# Patient Record
Sex: MALE | Race: WHITE | NOT HISPANIC OR LATINO
[De-identification: names, ages, dates, MRNs, and addresses within clinical notes are randomized per-mention and may not be internally consistent; named-entity substitution may affect disease eponyms.]

---

## 2018-02-06 ENCOUNTER — APPOINTMENT (OUTPATIENT)
Dept: DERMATOLOGY | Facility: CLINIC | Age: 58
End: 2018-02-06
Payer: COMMERCIAL

## 2018-02-06 VITALS — HEIGHT: 73 IN | WEIGHT: 208 LBS | BODY MASS INDEX: 27.57 KG/M2

## 2018-02-06 DIAGNOSIS — D48.5 NEOPLASM OF UNCERTAIN BEHAVIOR OF SKIN: ICD-10-CM

## 2018-02-06 DIAGNOSIS — Z00.00 ENCOUNTER FOR GENERAL ADULT MEDICAL EXAMINATION W/OUT ABNORMAL FINDINGS: ICD-10-CM

## 2018-02-06 DIAGNOSIS — Z80.9 FAMILY HISTORY OF MALIGNANT NEOPLASM, UNSPECIFIED: ICD-10-CM

## 2018-02-06 PROCEDURE — 11100 BX SKIN SUBCUTANEOUS&/MUCOUS MEMBRANE 1 LESION: CPT

## 2018-02-06 PROCEDURE — 99213 OFFICE O/P EST LOW 20 MIN: CPT | Mod: 25

## 2018-02-13 LAB — CORE LAB BIOPSY: NORMAL

## 2019-08-27 ENCOUNTER — APPOINTMENT (OUTPATIENT)
Dept: DERMATOLOGY | Facility: CLINIC | Age: 59
End: 2019-08-27
Payer: COMMERCIAL

## 2019-08-27 VITALS — WEIGHT: 204 LBS | BODY MASS INDEX: 27.04 KG/M2 | HEIGHT: 73 IN

## 2019-08-27 DIAGNOSIS — L25.5 UNSPECIFIED CONTACT DERMATITIS DUE TO PLANTS, EXCEPT FOOD: ICD-10-CM

## 2019-08-27 DIAGNOSIS — D22.9 MELANOCYTIC NEVI, UNSPECIFIED: ICD-10-CM

## 2019-08-27 DIAGNOSIS — D18.01 HEMANGIOMA OF SKIN AND SUBCUTANEOUS TISSUE: ICD-10-CM

## 2019-08-27 DIAGNOSIS — L82.1 OTHER SEBORRHEIC KERATOSIS: ICD-10-CM

## 2019-08-27 PROCEDURE — 99213 OFFICE O/P EST LOW 20 MIN: CPT

## 2019-08-27 NOTE — HISTORY OF PRESENT ILLNESS
[FreeTextEntry1] : Patient presents for skin examination. [de-identified] : Notes facial rash over the past week.  Was pruritic, currently asymptomatic.  No bleeding or discharge.

## 2019-08-27 NOTE — ASSESSMENT
[FreeTextEntry1] : A complete skin examination was performed.  There is no evidence of skin cancer.  We discussed the importance of photoprotection, including the use of hats, protective clothing and sunscreens with an SPF of at least 30.  Sun avoidance was also discussed.\par \par Rhus derm\par education.\par Declines tx.\par follow.

## 2020-02-10 ENCOUNTER — APPOINTMENT (OUTPATIENT)
Dept: ORTHOPEDIC SURGERY | Facility: CLINIC | Age: 60
End: 2020-02-10
Payer: COMMERCIAL

## 2020-02-10 VITALS
HEIGHT: 73 IN | BODY MASS INDEX: 27.04 KG/M2 | SYSTOLIC BLOOD PRESSURE: 137 MMHG | HEART RATE: 76 BPM | TEMPERATURE: 98 F | DIASTOLIC BLOOD PRESSURE: 87 MMHG | WEIGHT: 204 LBS

## 2020-02-10 PROCEDURE — 99213 OFFICE O/P EST LOW 20 MIN: CPT

## 2020-02-10 PROCEDURE — 73560 X-RAY EXAM OF KNEE 1 OR 2: CPT | Mod: RT

## 2020-02-10 RX ORDER — SODIUM HYALURONATE INTRA-ARTICULAR SOLN PREF SYR 25 MG/2.5ML 25/2.5 MG/ML
25 SOLUTION PREFILLED SYRINGE INTRAARTICULAR
Qty: 5 | Refills: 0 | Status: ACTIVE | OUTPATIENT
Start: 2020-02-10

## 2020-02-13 NOTE — PHYSICAL EXAM
[de-identified] : Left Ankle: \par Range of Motion in Degrees:\par 	                                Claimant:	                Normal:	\par Dorsiflexion (Active)	40-degrees	40-degrees	\par Dorsiflexion (Passive)	40-degrees	40-degrees	\par Plantar (Active)	                40-degrees	40-degrees	\par Plantar (Passive)	                40-degrees	40-degrees	\par Inversion (Active)	                30-degrees	30-degrees	\par Inversion (Passive)	                30-degrees	30-degrees	\par Eversion  (Active)	                20-degrees	20-degrees	\par Eversion (Passive) 	                20-degrees	20-degrees	\par \par No weakness in dorsiflexion, plantar flexion, inversion or eversion.  Normal sensation.  No tenderness over the medial or lateral ligaments.  No tenderness over the DLES.  No evidence of instability.  Negative anterior drawer sign.  No medial or lateral bony tenderness.  No proximal fibular tenderness.  No anterior, posterior, medial or lateral tendon tenderness.  No intra-articular swelling.  No extra-articular swelling, edema or tenderness.  No tenderness over the plantar aspect of the os calcis.  2+ DP and PT pulses. Skin is intact.  No rashes, scars or lesions.  \par  \par Right Ankle: \par Range of Motion in Degrees:\par 	                                Claimant:	Normal:	\par Dorsiflexion (Active)	40-degrees	40-degrees	\par Dorsiflexion (Passive)	40-degrees	40-degrees	\par Plantar (Active)       	40-degrees	40-degrees	\par Plantar (Passive)     	40-degrees	40-degrees	\par Inversion (Active)    	30-degrees	30-degrees	\par Inversion (Passive)  	30-degrees	30-degrees	\par Eversion  (Active)   	20-degrees	20-degrees	\par Eversion (Passive)   	20-degrees	20-degrees	\par \par No weakness in dorsiflexion, plantar flexion, inversion or eversion.  Normal sensation.  Positive diffuse swelling laterally.  Tender over the lateral ligaments.  No tenderness over the medial  ligaments.  No tenderness over the DLES.  No evidence of instability.  Negative anterior drawer sign.  No medial bony tenderness.  No proximal fibular tenderness.  No anterior, posterior, medial or lateral tendon tenderness.  No intra-articular swelling.  No extra-articular swelling, edema or tenderness.  No tenderness over the plantar aspect of the os calcis.  2+ DP and PT pulses. Skin is intact.  No rashes, scars or lesions.  \par  \par Left Knee: \par Range of Motion in Degrees	\par 	                  Claimant/Normal:\par 		\par Flexion Active            135                        135-degrees\par Flexion Passive	  135	                135-degrees	\par Extension Active	  0-5	                0-5-degrees	\par Extension Passive	  0-5	                0-5-degrees	\par \par No weakness to flexion/extension.  No evidence of instability in the AP plane or varus or valgus stress.  Negative  Lachman.  Negative pivot shift.  Negative anterior drawer test.  Negative posterior drawer test.  Negative Renata.  Negative Apley grind.  No medial or lateral joint line tenderness.  No tenderness over the medial and lateral facet of the patella.  No patellofemoral crepitations.  No lateral tilting patella.  No patellar apprehension.  No crepitation in the medial and lateral femoral condyle.  No proximal or distal swelling, edema or tenderness.  No gross motor or sensory deficits.  No intra-articular swelling.  2+ DP and PT pulses. No varus or valgus malalignment.  Skin is intact.  No rashes, scars or lesions.\par \par Right Knee: \par Range of Motion in Degrees	\par 	                  Claimant:	Normal:	\par Flexion Active	  120 	                135-degrees	\par Flexion Passive	  120	                135-degrees	\par Extension Active	  10	                0-5-degrees	\par Extension Passive	  10	                0-5-degrees	\par \par No weakness to flexion/extension. No evidence of instability in the AP plane or varus or valgus stress.  Negative  Lachman.  Negative pivot shift.  Negative anterior drawer test.  Negative posterior drawer test.  Negative Renata.  Negative Apley grind.  No medial or lateral joint line tenderness.  Positive tenderness over the medial and lateral facet of the patella.  Positive patellofemoral crepitations.  No lateral tilting patella.  No patella apprehension.  Positive crepitation in the medial and lateral femoral condyle.  No proximal or distal swelling, edema or tenderness.  No gross motor or sensory deficits. Mild intra-articular swelling.  2+ DP and PT pulses.  Moderate varus deformity.  No valgus malalignment.  Skin is intact.  No rashes, scars or lesions.  \par  [de-identified] : Ambulating with a slightly antalgic to antalgic gait.  Station:  Normal.  [de-identified] : General Appearance:  Well-developed, well-nourished male in no acute distress. \par  [de-identified] : Radiographs, two views of the right knee, show early severe degenerative changes of the medial compartment.

## 2020-02-13 NOTE — ADDENDUM
[FreeTextEntry1] : This note was written by Courtney Murguia on 02/13/2020 acting as scribe for Eliecer Ochoa III, MD

## 2020-02-13 NOTE — HISTORY OF PRESENT ILLNESS
[4] : the relief from medicine is 4/10 [de-identified] : The patient comes in today with right ankle and right knee complaints.  He has done viscosupplementation in the past for the right knee and it worked very well for him.  He is still very active.  This injury is not work related or due to an automobile accident.  The patient states the pain is intermittent.  The patient describes the pain as sharp and stabbing.  \par \par Pain level includes a current pain level of /10.\par \par Ailment Interference:  \par Daily Livin/10\par Normal Work:  4/10\par Sleep:  4/10\par Enjoyment of Life:  4/10\par Climbing Stairs:  4/10\par Sports:  4/10\par Extra-Curricular Activities:  4/10 [] : No [de-identified] : Aleve

## 2020-02-13 NOTE — DISCUSSION/SUMMARY
[de-identified] : At this time, I have recommended a course of viscosupplementation for the right knee osteoarthritis.  For the right lateral ankle sprain, he has been placed in a low profile brace.  I have recommended ice and anti-inflammatories.  He will be reassessed in three to four weeks.

## 2020-06-04 ENCOUNTER — APPOINTMENT (OUTPATIENT)
Dept: ORTHOPEDIC SURGERY | Facility: CLINIC | Age: 60
End: 2020-06-04
Payer: COMMERCIAL

## 2020-06-04 VITALS — TEMPERATURE: 98.5 F

## 2020-06-04 PROCEDURE — 20610 DRAIN/INJ JOINT/BURSA W/O US: CPT | Mod: RT

## 2020-06-10 NOTE — ADDENDUM
[FreeTextEntry1] : This note was written by Silvestre Gupta on 06/09/2020, acting as a scribe for Eliecer Ochoa III, MD

## 2020-06-10 NOTE — PROCEDURE
[de-identified] : \par Physical Examination:\par \par Right Knee: \par Range of Motion in Degrees	\par 	               Claimant:	 Normal:	\par Flexion Active	 120 	 135-degrees	\par Flexion Passive	 120	 135-degrees	\par Extension Active	 10	 0-5-degrees	\par Extension Passive	 10	 0-5-degrees	\par \par No weakness to flexion/extension. No evidence of instability in the AP plane or varus or valgus stress. Negative Lachman. Negative pivot shift. Negative anterior drawer test. Negative posterior drawer test. Negative Renata. Negative Apley grind. No medial or lateral joint line tenderness. Positive tenderness over the medial and lateral facet of the patella. Positive patellofemoral crepitations. No lateral tilting patella. No patella apprehension. Positive crepitation in the medial and lateral femoral condyle. No proximal or distal swelling, edema or tenderness. No gross motor or sensory deficits. Mild intra-articular swelling. 2+ DP and PT pulses. Moderate varus deformity. No valgus malalignment. Skin is intact. No rashes, scars or lesions. \par \par Impression: \par Osteoarthritis of the right knee\par \par Consent:\par The risks and benefits of the procedure were discussed with the patient in detail.  Upon verbal consent of the patient, we proceeded with the Supartz injection as noted below.  \par \par Procedure:\par After sterile prep, the patient underwent a Supartz injection of 25 mg of Sodium Hyaluronate in a 2.5 mL syringe into the right knee.  The patient tolerated the procedure well.  There were no complications.  \par \par NDC#:  [address]-9\par Lot#:    [id number]\par Exp Date:  10/31/22\par \par Plan:\par I have recommended ice and elevation.  The patient will be reassessed in one week for the next Supartz injection for the right knee osteoarthritis.   \par \par

## 2020-06-11 ENCOUNTER — APPOINTMENT (OUTPATIENT)
Dept: ORTHOPEDIC SURGERY | Facility: CLINIC | Age: 60
End: 2020-06-11
Payer: COMMERCIAL

## 2020-06-11 VITALS — TEMPERATURE: 97.8 F

## 2020-06-11 PROCEDURE — 20610 DRAIN/INJ JOINT/BURSA W/O US: CPT | Mod: RT

## 2020-06-15 NOTE — DISCUSSION/SUMMARY
[de-identified] : I have recommended ice and elevation. The patient will be reassessed in one week for the next Supartz injection for the right knee osteoarthritis.

## 2020-06-15 NOTE — HISTORY OF PRESENT ILLNESS
[de-identified] : The patient comes in today for his second Supartz injection into his right knee.

## 2020-06-15 NOTE — PROCEDURE
[de-identified] : Consent:\par The risks and benefits of the procedure were discussed with the patient in detail.  Upon verbal consent of the patient, we proceeded with the Supartz injection as noted below.  \par \par Procedure:\par After sterile prep, the patient underwent a Supartz injection of 25 mg of Sodium Hyaluronate in a 2.5 mL syringe into the right knee.  The patient tolerated the procedure well.  There were no complications.  \par \par NDC#: [address]-7\par Lot#: [id number]\par Exp Date: 10/31/22

## 2020-06-15 NOTE — PHYSICAL EXAM
[de-identified] : Right Knee: \par Range of Motion in Degrees	\par 	 Claimant:	 Normal:	\par Flexion Active	 120 	 135-degrees	\par Flexion Passive	 120	 135-degrees	\par Extension Active	 10	 0-5-degrees	\par Extension Passive	 10	 0-5-degrees	\par \par No weakness to flexion/extension. No evidence of instability in the AP plane or varus or valgus stress. Negative Lachman. Negative pivot shift. Negative anterior drawer test. Negative posterior drawer test. Negative Renata. Negative Apley grind. No medial or lateral joint line tenderness. Positive tenderness over the medial and lateral facet of the patella. Positive patellofemoral crepitations. No lateral tilting patella. No patella apprehension. Positive crepitation in the medial and lateral femoral condyle. No proximal or distal swelling, edema or tenderness. No gross motor or sensory deficits. Mild intra-articular swelling. 2+ DP and PT pulses. Moderate varus deformity. No valgus malalignment. Skin is intact. No rashes, scars or lesions.

## 2020-06-15 NOTE — ADDENDUM
[FreeTextEntry1] : This note was written by Courtney Murguia on 06/12/2020 acting as scribe for Eliecer Ochoa III, MD

## 2020-06-18 ENCOUNTER — APPOINTMENT (OUTPATIENT)
Dept: ORTHOPEDIC SURGERY | Facility: CLINIC | Age: 60
End: 2020-06-18
Payer: COMMERCIAL

## 2020-06-18 PROCEDURE — 20610 DRAIN/INJ JOINT/BURSA W/O US: CPT | Mod: RT

## 2020-06-19 NOTE — PROCEDURE
[de-identified] : \par Physical Examination:\par Right Knee:  Range of Motion in Degrees	\par 	             Claimant:	 Normal:	\par Flexion Active	 120 	 135-degrees	\par Flexion Passive	 120	 135-degrees	\par Extension Active	 10	 0-5-degrees	\par Extension Passive	 10	 0-5-degrees	\par \par No weakness to flexion/extension. No evidence of instability in the AP plane or varus or valgus stress. Negative Lachman. Negative pivot shift. Negative anterior drawer test. Negative posterior drawer test. Negative Renata. Negative Apley grind. No medial or lateral joint line tenderness. Positive tenderness over the medial and lateral facet of the patella. Positive patellofemoral crepitations. No lateral tilting patella. No patella apprehension. Positive crepitation in the medial and lateral femoral condyle. No proximal or distal swelling, edema or tenderness. No gross motor or sensory deficits. Mild intra-articular swelling. 2+ DP and PT pulses. Moderate varus deformity. No valgus malalignment. Skin is intact. No rashes, scars or lesions. \par \par Impression: \par Osteoarthritis of the right knee\par \par Consent:\par The risks and benefits of the procedure were discussed with the patient in detail.  Upon verbal consent of the patient, we proceeded with the Supartz injection as noted below.  \par \par Procedure:\par After sterile prep, the patient underwent a Supartz injection of 25 mg of Sodium Hyaluronate in a 2.5 mL syringe into the right knee.  The patient tolerated the procedure well.  There were no complications.  \par \par NDC#:  [address]-9\par Lot#:    [id number]\par Exp Date:  10/31/22\par \par Plan:\par I have recommended ice and elevation.  The patient will be reassessed in one week for the next Supartz injection for the right knee osteoarthritis.   \par \par

## 2020-06-19 NOTE — ADDENDUM
[FreeTextEntry1] : This note was written by Silvestre Gupta on 06/19/2020, acting as a scribe for Eliecer Ochoa III, MD

## 2020-06-25 ENCOUNTER — APPOINTMENT (OUTPATIENT)
Dept: ORTHOPEDIC SURGERY | Facility: CLINIC | Age: 60
End: 2020-06-25
Payer: COMMERCIAL

## 2020-06-25 PROCEDURE — 20610 DRAIN/INJ JOINT/BURSA W/O US: CPT | Mod: RT

## 2020-06-29 NOTE — PROCEDURE
[de-identified] : \par Physical Examination:\par Right Knee: Range of Motion in Degrees	\par 	              Claimant:	 Normal:	\par Flexion Active	 120 	 135-degrees	\par Flexion Passive	 120	 135-degrees	\par Extension Active	 10	 0-5-degrees	\par Extension Passive	 10	 0-5-degrees	\par \par No weakness to flexion/extension. No evidence of instability in the AP plane or varus or valgus stress. Negative Lachman. Negative pivot shift. Negative anterior drawer test. Negative posterior drawer test. Negative Renata. Negative Apley grind. No medial or lateral joint line tenderness. Positive tenderness over the medial and lateral facet of the patella. Positive patellofemoral crepitations. No lateral tilting patella. No patella apprehension. Positive crepitation in the medial and lateral femoral condyle. No proximal or distal swelling, edema or tenderness. No gross motor or sensory deficits. Mild intra-articular swelling. 2+ DP and PT pulses. Moderate varus deformity. No valgus malalignment. Skin is intact. No rashes, scars or lesions. \par \par Impression: \par Osteoarthritis of the right knee\par \par Consent:\par The risks and benefits of the procedure were discussed with the patient in detail.  Upon verbal consent of the patient, we proceeded with the Supartz injection as noted below.  \par \par Procedure:\par After sterile prep, the patient underwent a Supartz injection of 25 mg of Sodium Hyaluronate in a 2.5 mL syringe into the right knee.  The patient tolerated the procedure well.  There were no complications.  \par \par NDC#:  [address]-1\par Lot#:    [id number]\par Exp Date:  10/31/2022\par \par Plan:\par I have recommended ice and elevation.  The patient will be reassessed in one week for the next Supartz injection for the right knee osteoarthritis.   \par \par

## 2020-06-29 NOTE — ADDENDUM
[FreeTextEntry1] : This note was written by Silvestre Gupta on 06/26/2020, acting as a scribe for Eliecer Ochoa III, MD

## 2020-07-02 ENCOUNTER — APPOINTMENT (OUTPATIENT)
Dept: ORTHOPEDIC SURGERY | Facility: CLINIC | Age: 60
End: 2020-07-02
Payer: COMMERCIAL

## 2020-07-02 VITALS — TEMPERATURE: 98.5 F

## 2020-07-02 PROCEDURE — 20610 DRAIN/INJ JOINT/BURSA W/O US: CPT | Mod: RT

## 2020-07-06 NOTE — PROCEDURE
[de-identified] : PEGGY BARAJAS is a 59 year old male being seen for the fourth Supartz injection to his right knee.\par \par Physical Examination:\par Right Knee: \par Range of Motion in Degrees	\par 	 Claimant:	 Normal:	\par Flexion Active	 120 	 135-degrees	\par Flexion Passive	 120	 135-degrees	\par Extension Active	 10	 0-5-degrees	\par Extension Passive	 10	 0-5-degrees	\par \par No weakness to flexion/extension. No evidence of instability in the AP plane or varus or valgus stress. Negative Lachman. Negative pivot shift. Negative anterior drawer test. Negative posterior drawer test. Negative Renata. Negative Apley grind. No medial or lateral joint line tenderness. Positive tenderness over the medial and lateral facet of the patella. Positive patellofemoral crepitations. No lateral tilting patella. No patella apprehension. Positive crepitation in the medial and lateral femoral condyle. No proximal or distal swelling, edema or tenderness. No gross motor or sensory deficits. Mild intra-articular swelling. 2+ DP and PT pulses. Moderate varus deformity. No valgus malalignment. Skin is intact. No rashes, scars or lesions. \par \par Impression: \par Osteoarthritis of the right knee\par \par Consent:\par The risks and benefits of the procedure were discussed with the patient in detail. Upon verbal consent of the patient, we proceeded with the Supartz injection as noted below. \par \par Procedure:\par After sterile prep, the patient underwent a Supartz injection of 25 mg of Sodium Hyaluronate in a 2.5 mL syringe into the right knee. The patient tolerated the procedure well. There were no complications. \par \par NDC#: [address]-9\par Lot#: [id number]\par Exp Date: 10/31/2022\par \par Plan:\par I have recommended ice and elevation. The patient will be reassessed in six to eight weeks for the right knee osteoarthritis. \par

## 2020-07-06 NOTE — ADDENDUM
[FreeTextEntry1] : This note was written by Eleanor Hoffman on 07/06/2020 acting as a scribe for CARLOS ALBERTO BLAKELY III, MD

## 2021-10-22 ENCOUNTER — APPOINTMENT (OUTPATIENT)
Dept: GASTROENTEROLOGY | Facility: CLINIC | Age: 61
End: 2021-10-22
Payer: COMMERCIAL

## 2021-10-22 VITALS
DIASTOLIC BLOOD PRESSURE: 79 MMHG | HEART RATE: 61 BPM | BODY MASS INDEX: 26.64 KG/M2 | SYSTOLIC BLOOD PRESSURE: 132 MMHG | WEIGHT: 201 LBS | HEIGHT: 73 IN

## 2021-10-22 DIAGNOSIS — Z12.11 ENCOUNTER FOR SCREENING FOR MALIGNANT NEOPLASM OF COLON: ICD-10-CM

## 2021-10-22 PROCEDURE — 99202 OFFICE O/P NEW SF 15 MIN: CPT

## 2021-10-22 RX ORDER — SODIUM SULFATE, POTASSIUM SULFATE, MAGNESIUM SULFATE 17.5; 3.13; 1.6 G/ML; G/ML; G/ML
17.5-3.13-1.6 SOLUTION, CONCENTRATE ORAL
Qty: 2 | Refills: 0 | Status: ACTIVE | COMMUNITY
Start: 2021-10-22 | End: 1900-01-01

## 2021-10-22 NOTE — HISTORY OF PRESENT ILLNESS
[de-identified] : Mr. PEGGY BARAJAS is a 61 year old male presents for screening colonoscopy. Patient has no complaints of bowel issues, bleeding, abdominal pain, family history of colon cancer, GERD symptoms. Patient had last colonoscopy in 2016. Patient had recent melanoma surgery. \par \par

## 2021-12-06 ENCOUNTER — APPOINTMENT (OUTPATIENT)
Dept: ORTHOPEDIC SURGERY | Facility: CLINIC | Age: 61
End: 2021-12-06
Payer: COMMERCIAL

## 2021-12-06 VITALS
DIASTOLIC BLOOD PRESSURE: 88 MMHG | HEIGHT: 73 IN | HEART RATE: 82 BPM | SYSTOLIC BLOOD PRESSURE: 130 MMHG | WEIGHT: 201 LBS | BODY MASS INDEX: 26.64 KG/M2

## 2021-12-06 DIAGNOSIS — M77.12 LATERAL EPICONDYLITIS, LEFT ELBOW: ICD-10-CM

## 2021-12-06 PROCEDURE — 73070 X-RAY EXAM OF ELBOW: CPT | Mod: LT

## 2021-12-06 PROCEDURE — 99213 OFFICE O/P EST LOW 20 MIN: CPT

## 2021-12-09 NOTE — PHYSICAL EXAM
[de-identified] : Right Elbow: \par Range of Motion in Degrees\par 	                                          Claimant:	Normal:	\par Flexion (Active)	                          170	170	\par Flexion (Passive)	                          170	170	\par Extension(Active)	                           0	                 0	\par Extension (Passive)	           0	                 0	\par Pronation/Supination (Active)	           0-180	 0-180	\par Pronation/Supination (Passive)          0-180             0-180	\par \par No tenderness to palpation over the medial and lateral epicondyle.  No pain with resisted dorsi or palmar flexion with .  No tenderness over the distal biceps.  No tenderness over the olecranon.  No swelling over the olecranon.  No instability to varus or valgus stress at 0, 30, 60 and 90 degrees.  No instability in the AP plane.  No motor or sensory deficits.  2+ radial and ulnar pulses.  Skin is intact.  No rashes, scars or lesions. \par \par Left Elbow:\par Range of Motion in Degrees:\par 	                                               Claimant:	        Normal:	\par Flexion (Active)	                                 170	          170	\par Flexion (Passive)	                                 170	          170	\par Extension(Active)	                                   0	            0	\par Extension (Passive)	                   0	            0	\par Pronation/Supination (Active)	                0-180	          0-180	\par Pronation/Supination (Passive)	0-180	          0-180	\par             \par Point tenderness over the lateral epicondyle.  No tenderness to palpation over the medial epicondyle.  Pain with resisted dorsi and palmar flexion with .  No tenderness over the distal biceps.  No tenderness over the olecranon.  No swelling over the olecranon.  No instability to varus or valgus stress at 0, 30, 60 and 90 degrees.  No instability in the AP plane.  No motor or sensory deficits.   2+ radial and ulnar pulses.  Skin is intact.  No rashes, scars or lesions. \par  [de-identified] : Ambulating with a normal gait. Station:  Normal.  [de-identified] : Appearance:  Well-developed, well-nourished male in no acute distress.\par   [de-identified] : Radiographs, two views of the left elbow, show a large olecranon spur, otherwise no obvious osseous abnormalities.

## 2021-12-09 NOTE — HISTORY OF PRESENT ILLNESS
[de-identified] : The patient comes in today with complaints of pain to his left elbow.  He went to take something out of his truck and had an onset of pain over the lateral aspect.

## 2021-12-09 NOTE — ADDENDUM
[FreeTextEntry1] : This note was written by Eleanor Hoffman on 12/09/2021 acting as a scribe for CARLOS ALBERTO BLAKELY III, MD

## 2021-12-23 ENCOUNTER — RESULT REVIEW (OUTPATIENT)
Age: 61
End: 2021-12-23

## 2021-12-23 ENCOUNTER — APPOINTMENT (OUTPATIENT)
Dept: GASTROENTEROLOGY | Facility: AMBULATORY MEDICAL SERVICES | Age: 61
End: 2021-12-23
Payer: COMMERCIAL

## 2021-12-23 PROCEDURE — 45380 COLONOSCOPY AND BIOPSY: CPT | Mod: 33

## 2023-03-18 NOTE — PHYSICAL EXAM
[Alert] : alert [Oriented x 3] : ~L oriented x 3 [Full Body Skin Exam Performed] : performed [Well Nourished] : well nourished [FreeTextEntry3] : A full skin exam was performed including the scalp, face (including lips, ears, nose and eyes), neck, chest, abdomen, back, buttocks, upper extremities and lower extremities.  The patient declined examination of the genitalia.  \par The exam revealed the following benign growths:\par Kenedy pigmented nevi.\par Seborrheic keratoses.\par Cherry angioma.\par \par Spongiotic patches, right nose, right lateral eyebrow and forehead.\par  18-Mar-2023 19:01

## 2024-04-16 ENCOUNTER — APPOINTMENT (OUTPATIENT)
Dept: ORTHOPEDIC SURGERY | Facility: CLINIC | Age: 64
End: 2024-04-16
Payer: COMMERCIAL

## 2024-04-16 VITALS
DIASTOLIC BLOOD PRESSURE: 65 MMHG | SYSTOLIC BLOOD PRESSURE: 111 MMHG | WEIGHT: 201 LBS | BODY MASS INDEX: 26.64 KG/M2 | HEIGHT: 73 IN | HEART RATE: 64 BPM

## 2024-04-16 DIAGNOSIS — M17.11 UNILATERAL PRIMARY OSTEOARTHRITIS, RIGHT KNEE: ICD-10-CM

## 2024-04-16 PROCEDURE — 99213 OFFICE O/P EST LOW 20 MIN: CPT

## 2024-04-16 PROCEDURE — 73560 X-RAY EXAM OF KNEE 1 OR 2: CPT | Mod: 26,RT

## 2024-04-16 PROCEDURE — 73600 X-RAY EXAM OF ANKLE: CPT | Mod: 26,RT

## 2024-04-16 RX ORDER — SODIUM HYALURONATE INTRA-ARTICULAR SOLN PREF SYR 25 MG/2.5ML 25/2.5 MG/ML
25 SOLUTION PREFILLED SYRINGE INTRAARTICULAR
Qty: 5 | Refills: 0 | Status: ACTIVE | OUTPATIENT
Start: 2024-04-16

## 2024-04-22 ENCOUNTER — APPOINTMENT (OUTPATIENT)
Dept: ORTHOPEDIC SURGERY | Facility: CLINIC | Age: 64
End: 2024-04-22
Payer: COMMERCIAL

## 2024-04-22 PROCEDURE — 99213 OFFICE O/P EST LOW 20 MIN: CPT

## 2024-04-23 PROBLEM — M17.11 PRIMARY OSTEOARTHRITIS OF RIGHT KNEE: Status: ACTIVE | Noted: 2020-02-10

## 2024-04-23 NOTE — REASON FOR VISIT
[Follow-Up Visit] : a follow-up visit for [FreeTextEntry2] : his right knee and new concern for his right ankle.

## 2024-04-23 NOTE — PHYSICAL EXAM
[de-identified] : Right Ankle:   He has good range of motion of his toes.  Slightly tender on the distal fibula.  No calf tenderness.  Good distal pulses. He has a lot of pain on the lateral side of the ligaments on the ankle.    Left Ankle: Ankle: Range of Motion in Degrees: 	                                Claimant:	                Normal:	 Dorsiflexion (Active)	40-degrees	40-degrees	 Dorsiflexion (Passive)	40-degrees	40-degrees	 Plantar (Active)	                40-degrees	40-degrees	 Plantar (Passive)	                40-degrees	40-degrees	 Inversion (Active)	                30-degrees	30-degrees	 Inversion (Passive)	                30-degrees	30-degrees	 Eversion  (Active)	                20-degrees	20-degrees	 Eversion (Passive) 	                20-degrees	20-degrees	  No weakness in dorsiflexion, plantar flexion, inversion or eversion.  Normal sensation.  No tenderness over the medial or lateral ligaments.  No tenderness over the DLES.  No evidence of instability.  Negative anterior drawer sign.  No medial or lateral bony tenderness.  No proximal fibular tenderness.  No anterior, posterior, medial or lateral tendon tenderness.  No intra-articular swelling.  No extra-articular swelling, edema or tenderness.  No tenderness over the plantar aspect of the os calcis.  2+ DP and PT pulses. Skin is intact.  No rashes, scars or lesions.    Right Knee: Knee: Range of Motion in Degrees	 	                  Claimant:	Normal:	 Flexion Active	  135 	                135-degrees	 Flexion Passive	  135	                135-degrees	 Extension Active	  0-5	                0-5-degrees	 Extension Passive	  0-5	                0-5-degrees	  No weakness to flexion/extension.  No evidence of instability in the AP plane or varus or valgus stress.  Negative  Lachman.  Negative pivot shift.  Negative anterior drawer test.  Negative posterior drawer test.  Negative Renata.  Negative Apley grind.  No medial or lateral joint line tenderness.  Positive tenderness over the medial and lateral facet of the patella.  Positive patellofemoral crepitations.  No lateral tilting patella.  No patella apprehension.  Positive crepitation in the medial and lateral femoral condyle.  No proximal or distal swelling, edema or tenderness.  No gross motor or sensory deficits.  Mild intra-articular swelling.  2+ DP and PT pulses.  No varus or valgus malalignment.  Skin is intact.  No rashes, scars or lesions.     [de-identified] : Gait and Station:  Ambulating with a slightly antalgic to antalgic gait.  Station:  Normal.  [de-identified] : Appearance:  Well-developed, well-nourished male in no acute distress.   [de-identified] : Radiographs, two views of the right ankle taken in the office today, reveal a possible little distal fibula fracture.  It looks like it is in the joint line, but he has a lot of old fractures on the x-rays as well with old callous.    Radiographs, one to two views of the right knee taken in the office today, reveal severe arthritis.

## 2024-04-23 NOTE — ADDENDUM
[FreeTextEntry1] : This note was written by Amy Coronado on 04/23/2024 acting as scribe for Liset Arshad, OTR/L, PA

## 2024-04-23 NOTE — HISTORY OF PRESENT ILLNESS
[de-identified] : The patient comes in today stating he twisted his right ankle playing hockey last night.  He is also complaining of right knee pain.

## 2024-04-23 NOTE — DISCUSSION/SUMMARY
[de-identified] : The patient presents with a possible distal fibula fracture and ankle sprain of the right ankle.  The patient will be placed into a low-profile ankle brace with a good sneaker.  We will do another x-ray in one week to see how he is feeling.  He will follow up in one week.  For the right knee osteoarthritis, we will order Supartz injections.  He will return to the office when the injections come in.

## 2024-04-25 RX ORDER — HYALURONATE SODIUM 20 MG/2 ML
20 SYRINGE (ML) INTRAARTICULAR
Qty: 1 | Refills: 0 | Status: ACTIVE | COMMUNITY
Start: 2024-04-25

## 2024-04-29 VITALS — BODY MASS INDEX: 26.64 KG/M2 | WEIGHT: 201 LBS | HEIGHT: 73 IN

## 2024-05-03 ENCOUNTER — APPOINTMENT (OUTPATIENT)
Dept: ORTHOPEDIC SURGERY | Facility: CLINIC | Age: 64
End: 2024-05-03
Payer: COMMERCIAL

## 2024-05-03 PROCEDURE — 20610 DRAIN/INJ JOINT/BURSA W/O US: CPT | Mod: RT

## 2024-05-06 NOTE — DISCUSSION/SUMMARY
[de-identified] : At this time, due to lateral ankle sprain of the right ankle, I recommended he continue bracing, weightbearing as tolerated. He will be reassessed in four weeks for possible therapy.

## 2024-05-06 NOTE — HISTORY OF PRESENT ILLNESS
[de-identified] : The patient comes in today for his right ankle status post an injury while he was playing hockey. He saw my physician's assistant, Liset Arshad, and comes in today for evaluation.

## 2024-05-06 NOTE — PHYSICAL EXAM
[de-identified] : Right Ankle:  Range of Motion in Degrees: 	                                                   Claimant:	    Normal:	 Dorsiflexion (Active)	                40-degrees	  40-degrees	 Dorsiflexion (Passive)	                40-degrees	  40-degrees	 Plantar (Active)       	                40-degrees	  40-degrees	 Plantar (Passive)     	                40-degrees	  40-degrees	 Inversion (Active)    	                30-degrees	  30-degrees	 Inversion (Passive)  	                30-degrees	  30-degrees	 Eversion  (Active)   	                        20-degrees	  20-degrees	 Eversion (Passive)   	                20-degrees	  20-degrees	  No weakness in dorsiflexion, plantar flexion, inversion or eversion.  Normal sensation.  Positive diffuse swelling laterally.  Tender over the lateral ligaments.  No tenderness over the medial ligaments.  No tenderness over the DLES.  No evidence of instability.  Negative anterior drawer sign.  No medial bony tenderness.  No proximal fibular tenderness.  No anterior, posterior, medial or lateral tendon tenderness.  No intra-articular swelling.  No extra-articular swelling, edema or tenderness.  No tenderness over the plantar aspect of the os calcis.  2+ DP and PT pulses. Skin is intact.  No rashes, scars or lesions.     [de-identified] : Ambulating with a slightly antalgic to antalgic gait.  Station:  Normal.  [de-identified] : Appearance:  Well-developed, well-nourished male in no acute distress.

## 2024-05-06 NOTE — ADDENDUM
[FreeTextEntry1] : This note was written by Eleanor Hoffman on 04/29/2024 acting as a scribe for CARLOS ALBERTO BLAKELY III, MD

## 2024-05-07 NOTE — PHYSICAL EXAM
[de-identified] : Right Knee: Range of Motion in Degrees      Claimant: Normal:  Flexion Active  135    135-degrees  Flexion Passive  135   135-degrees  Extension Active  0-5   0-5-degrees  Extension Passive  0-5   0-5-degrees   No weakness to flexion/extension. No evidence of instability in the AP plane or varus or valgus stress. Negative Lachman. Negative pivot shift. Negative anterior drawer test. Negative posterior drawer test. Negative Renata. Negative Apley grind. No medial or lateral joint line tenderness. Positive tenderness over the medial and lateral facet of the patella. Positive patellofemoral crepitations. No lateral tilting patella. No patella apprehension. Positive crepitation in the medial and lateral femoral condyle. No proximal or distal swelling, edema or tenderness. No gross motor or sensory deficits. Mild intra-articular swelling. 2+ DP and PT pulses. No varus or valgus malalignment. Skin is intact. No rashes, scars or lesions.

## 2024-05-07 NOTE — PROCEDURE
[de-identified] : Indication: Osteoarthritis of the right knee Consent: The risks and benefits of the procedure were discussed with the patient in detail.  Upon verbal consent of the patient, we proceeded with the Euflexxa injection as noted below.    Description of Procedure: Under sterile conditions, the patient underwent a Euflexxa injection to the right knee of 20 mg sodium Hyaluronate, 17 mg sodium chloride, 1.12 mg disodium hydrogen phosphate dodecahydrate, .10 mg sodium dihydrogen phosphate dehydrate in a 2 ml syringe without any complications.  The patient tolerated this well.   :  [employer] NDC #: [address]-1 Lot #:    [id number] Expiration: 04/28/2025  Plan:  I have recommended ice and elevation.  The patient will be reassessed in one week for the next Euflexxa injection for the osteoarthritis of the right knee.

## 2024-05-07 NOTE — ADDENDUM
[FreeTextEntry1] : This note was written by Eleanor Hoffman on 05/07/2024 acting as scribe for Liset Arshad, OTR/L, PA

## 2024-05-10 ENCOUNTER — APPOINTMENT (OUTPATIENT)
Dept: ORTHOPEDIC SURGERY | Facility: CLINIC | Age: 64
End: 2024-05-10
Payer: COMMERCIAL

## 2024-05-10 PROCEDURE — 20610 DRAIN/INJ JOINT/BURSA W/O US: CPT | Mod: RT

## 2024-05-14 NOTE — PROCEDURE
[de-identified] : Indication:  Osteoarthritis of the right knee  Consent: The risks and benefits of the procedure were discussed with the patient in detail.  Upon verbal consent of the patient, we proceeded with the Euflexxa injection as noted below.    Description of Procedure: Under sterile conditions, the patient underwent a Euflexxa injection to the right knee of 20 mg sodium Hyaluronate, 17 mg sodium chloride, 1.12 mg disodium hydrogen phosphate dodecahydrate, .10 mg sodium dihydrogen phosphate dehydrate in a 2 mL syringe without any complications.  The patient tolerated this well.   :  [employer] NDC #: [address]-2 Lot #:    [id number] Expiration: 04/28/2025  Plan:  I have recommended ice and elevation.  The patient will be reassessed in one week for the next Euflexxa injection for the osteoarthritis of the right knee.

## 2024-05-14 NOTE — PHYSICAL EXAM
[de-identified] : Right Knee: Knee:  Range of Motion in Degrees:     Claimant: Normal:  Flexion Active  135    135-degrees  Flexion Passive  135   135-degrees  Extension Active  0-5   0-5-degrees  Extension Passive  0-5   0-5-degrees   No weakness to flexion/extension. No evidence of instability in the AP plane or varus or valgus stress. Negative Lachman. Negative pivot shift. Negative anterior drawer test. Negative posterior drawer test. Negative Renata. Negative Apley grind. No medial or lateral joint line tenderness. Positive tenderness over the medial and lateral facet of the patella. Positive patellofemoral crepitations. No lateral tilting patella. No patella apprehension. Positive crepitation in the medial and lateral femoral condyle. No proximal or distal swelling, edema or tenderness. No gross motor or sensory deficits. Mild intra-articular swelling. 2+ DP and PT pulses. No varus or valgus malalignment. Skin is intact. No rashes, scars or lesions.

## 2024-05-14 NOTE — ADDENDUM
[FreeTextEntry1] : This note was written by Amy Coronado on 05/14/2024 acting as scribe for Liset Arshad, OTR/L, PA

## 2024-05-21 ENCOUNTER — APPOINTMENT (OUTPATIENT)
Dept: ORTHOPEDIC SURGERY | Facility: CLINIC | Age: 64
End: 2024-05-21
Payer: COMMERCIAL

## 2024-05-21 DIAGNOSIS — M17.11 UNILATERAL PRIMARY OSTEOARTHRITIS, RIGHT KNEE: ICD-10-CM

## 2024-05-21 PROCEDURE — 20610 DRAIN/INJ JOINT/BURSA W/O US: CPT | Mod: RT

## 2024-05-22 NOTE — PROCEDURE
[de-identified] :   Indication: Osteoarthritis of right knee   Consent: The risks and benefits of the procedure were discussed with the patient in detail.  Upon verbal consent of the patient, we proceeded with the Euflexxa injection as noted below.    Description of Procedure: Under sterile conditions, the patient underwent a Euflexxa injection to the right knee of 20 mg sodium Hyaluronate, 17 mg sodium chloride, 1.12 mg disodium hydrogen phosphate dodecahydrate, .10 mg sodium dihydrogen phosphate dehydrate in a 2 mL syringe without any complications.  The patient tolerated this well.   :  [employer] NDC#:  [address]-6 Lot#:    [id number] Expiration Date:  04/28/2025   Plan: I have recommended ice and elevation.  The patient will be reassessed in 6-8 weeks for osteoarthritis of the right knee.

## 2024-05-22 NOTE — ADDENDUM
[FreeTextEntry1] : This note was written by Silvestre Gupta on 05/22/2024, acting as a scribe for KODY PICHARDO, CHE/L, PA

## 2024-05-22 NOTE — PHYSICAL EXAM
[de-identified] : Right Knee: Knee: Range of Motion in Degrees: Claimant: Normal: Flexion Active 135 135-degrees Flexion Passive 135 135-degrees Extension Active 0-5 0-5-degrees Extension Passive 0-5 0-5-degrees  No weakness to flexion/extension. No evidence of instability in the AP plane or varus or valgus stress. Negative Lachman. Negative pivot shift. Negative anterior drawer test. Negative posterior drawer test. Negative Renata. Negative Apley grind. No medial or lateral joint line tenderness. Positive tenderness over the medial and lateral facet of the patella. Positive patellofemoral crepitations. No lateral tilting patella. No patella apprehension. Positive crepitation in the medial and lateral femoral condyle. No proximal or distal swelling, edema or tenderness. No gross motor or sensory deficits. Mild intra-articular swelling. 2+ DP and PT pulses. No varus or valgus malalignment. Skin is intact. No rashes, scars or lesions.

## 2024-05-29 ENCOUNTER — APPOINTMENT (OUTPATIENT)
Dept: ORTHOPEDIC SURGERY | Facility: CLINIC | Age: 64
End: 2024-05-29
Payer: COMMERCIAL

## 2024-05-29 VITALS
DIASTOLIC BLOOD PRESSURE: 75 MMHG | HEIGHT: 73 IN | SYSTOLIC BLOOD PRESSURE: 120 MMHG | BODY MASS INDEX: 26.64 KG/M2 | HEART RATE: 64 BPM | WEIGHT: 201 LBS

## 2024-05-29 DIAGNOSIS — S93.401A SPRAIN OF UNSPECIFIED LIGAMENT OF RIGHT ANKLE, INITIAL ENCOUNTER: ICD-10-CM

## 2024-05-29 PROCEDURE — 99212 OFFICE O/P EST SF 10 MIN: CPT

## 2024-06-04 PROBLEM — S93.401A SPRAIN OF RIGHT ANKLE, UNSPECIFIED LIGAMENT, INITIAL ENCOUNTER: Status: ACTIVE | Noted: 2020-02-10

## 2024-06-04 NOTE — CONSULT LETTER
[Referral Letter:] : I am referring [unfilled] to you for further evaluation.  My most recent evaluation follows. [Please see my note below.] : Please see my note below. [Referral Closing:] : Thank you very much for seeing this patient.  If you have any questions, please do not hesitate to contact me. [Sincerely,] : Sincerely, [FreeTextEntry3] : Eliecer Ochoa, III, MD [facility]

## 2024-06-04 NOTE — DISCUSSION/SUMMARY
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 Soliman for further management.

## 2024-06-04 NOTE — PHYSICAL EXAM
[de-identified] : Right Ankle: Range of Motion in Degrees:         Claimant:  Normal:  Dorsiflexion (Active)   40-degrees  40-degrees  Dorsiflexion (Passive)   40-degrees  40-degrees  Plantar (Active)    40-degrees  40-degrees  Plantar (Passive)    40-degrees  40-degrees  Inversion (Active)    30-degrees  30-degrees  Inversion (Passive)    30-degrees  30-degrees  Eversion (Active)     20-degrees  20-degrees  Eversion (Passive)    20-degrees  20-degrees   No weakness in dorsiflexion, plantar flexion, inversion or eversion. Normal sensation. Positive diffuse swelling laterally. Tender over the lateral ligaments. No tenderness over the medial ligaments. No tenderness over the DLES. No evidence of instability. Negative anterior drawer sign. No medial bony tenderness. No proximal fibular tenderness. No anterior, posterior, medial or lateral tendon tenderness. No intra-articular swelling. No extra-articular swelling, edema or tenderness. No tenderness over the plantar aspect of the os calcis. 2+ DP and PT pulses. Skin is intact. No rashes, scars or lesions.    [de-identified] : Gait and Station:  Ambulating with a slightly antalgic to antalgic gait.  Normal Station.  [de-identified] : Appearance:  Well-developed, well-nourished male in no acute distress.   [de-identified] : Review of radiographs of the right ankle reveal no interval change.

## 2024-06-04 NOTE — ADDENDUM
[FreeTextEntry1] : This note was written by Silvestre Gupta on 06/04/2024, acting as a scribe for Eliecer Ochoa III, MD

## 2024-07-19 ENCOUNTER — APPOINTMENT (OUTPATIENT)
Dept: ORTHOPEDIC SURGERY | Facility: CLINIC | Age: 64
End: 2024-07-19
Payer: COMMERCIAL

## 2024-07-19 VITALS — BODY MASS INDEX: 25.84 KG/M2 | HEIGHT: 73 IN | WEIGHT: 195 LBS

## 2024-07-19 DIAGNOSIS — M19.171 POST-TRAUMATIC OSTEOARTHRITIS, RIGHT ANKLE AND FOOT: ICD-10-CM

## 2024-07-19 PROCEDURE — 99214 OFFICE O/P EST MOD 30 MIN: CPT

## 2024-09-06 ENCOUNTER — APPOINTMENT (OUTPATIENT)
Dept: ORTHOPEDIC SURGERY | Facility: CLINIC | Age: 64
End: 2024-09-06